# Patient Record
Sex: FEMALE | Race: WHITE | Employment: PART TIME | URBAN - METROPOLITAN AREA
[De-identification: names, ages, dates, MRNs, and addresses within clinical notes are randomized per-mention and may not be internally consistent; named-entity substitution may affect disease eponyms.]

---

## 2017-03-28 PROBLEM — Z82.49 FAMILY HISTORY OF HEART DISEASE: Status: ACTIVE | Noted: 2017-03-28

## 2017-03-28 PROBLEM — Z83.3 FAMILY HISTORY OF DIABETES MELLITUS: Status: ACTIVE | Noted: 2017-03-28

## 2017-03-28 PROBLEM — R23.2 HOT FLASHES: Status: ACTIVE | Noted: 2017-03-28

## 2017-03-28 PROBLEM — Z79.899 ENCOUNTER FOR LONG-TERM (CURRENT) DRUG USE: Status: ACTIVE | Noted: 2017-03-28

## 2017-03-28 PROBLEM — R53.82 CHRONIC FATIGUE: Status: ACTIVE | Noted: 2017-03-28

## 2017-03-28 PROBLEM — E55.9 VITAMIN D DEFICIENCY: Status: ACTIVE | Noted: 2017-03-28

## 2017-03-28 PROBLEM — Z76.89 ENCOUNTER TO ESTABLISH CARE WITH NEW DOCTOR: Status: ACTIVE | Noted: 2017-03-28

## 2017-03-29 PROBLEM — E78.2 MIXED HYPERLIPIDEMIA: Status: ACTIVE | Noted: 2017-03-29

## 2017-03-29 PROBLEM — E78.2 MIXED HYPERLIPIDEMIA: Chronic | Status: ACTIVE | Noted: 2017-03-29

## 2017-03-29 PROBLEM — E55.9 VITAMIN D DEFICIENCY: Chronic | Status: ACTIVE | Noted: 2017-03-28

## 2017-04-03 ENCOUNTER — HOSPITAL ENCOUNTER (OUTPATIENT)
Dept: ULTRASOUND IMAGING | Age: 32
Discharge: HOME | End: 2017-04-03
Attending: FAMILY MEDICINE
Payer: COMMERCIAL

## 2017-04-03 DIAGNOSIS — R00.0 TACHYCARDIA: ICD-10-CM

## 2017-04-03 DIAGNOSIS — E04.9 ENLARGED THYROID: ICD-10-CM

## 2017-04-03 DIAGNOSIS — R13.14 PHARYNGOESOPHAGEAL DYSPHAGIA: ICD-10-CM

## 2017-04-03 PROCEDURE — 76536 US EXAM OF HEAD AND NECK: CPT

## 2017-04-03 NOTE — PROGRESS NOTES
Thyroid ultrasound: there is a 3 mm hypoechoic solid nodule in the left side of the thyroid gland. Thyroid cancer appears to be more common in nodules >2 cm. Will recheck an ultrasound in 6 to 12 months.

## 2017-09-28 PROBLEM — Z87.898 HISTORY OF SEIZURE: Status: ACTIVE | Noted: 2017-09-28

## 2017-09-28 PROBLEM — J30.1 SEASONAL ALLERGIC RHINITIS DUE TO POLLEN: Status: ACTIVE | Noted: 2017-09-28

## 2017-09-28 PROBLEM — J30.1 SEASONAL ALLERGIC RHINITIS DUE TO POLLEN: Chronic | Status: ACTIVE | Noted: 2017-09-28

## 2017-09-28 PROBLEM — Z87.898 HISTORY OF SEIZURE: Chronic | Status: ACTIVE | Noted: 2017-09-28

## 2019-06-04 PROBLEM — M25.50 MULTIPLE JOINT PAIN: Status: ACTIVE | Noted: 2019-06-04

## 2019-06-04 PROBLEM — Z83.3 FAMILY HISTORY OF DIABETES MELLITUS IN MATERNAL GRANDMOTHER: Status: ACTIVE | Noted: 2019-06-04

## 2019-06-04 PROBLEM — D22.9 CHANGE IN COLOR OF SKIN MOLE: Status: ACTIVE | Noted: 2019-06-04

## 2020-10-29 PROBLEM — F33.0 MILD EPISODE OF RECURRENT MAJOR DEPRESSIVE DISORDER (HCC): Status: ACTIVE | Noted: 2020-10-29

## 2020-10-29 PROBLEM — Z79.899 ENCOUNTER FOR LONG-TERM (CURRENT) USE OF MEDICATIONS: Status: ACTIVE | Noted: 2017-03-28

## 2020-10-29 PROBLEM — F41.9 ANXIETY: Status: ACTIVE | Noted: 2020-10-29

## 2022-01-10 PROBLEM — R63.4 WEIGHT LOSS: Status: ACTIVE | Noted: 2022-01-10

## 2022-01-10 PROBLEM — Z87.898 HISTORY OF SYNCOPE: Status: ACTIVE | Noted: 2022-01-10

## 2022-03-18 PROBLEM — Z83.3 FAMILY HISTORY OF DIABETES MELLITUS: Status: ACTIVE | Noted: 2017-03-28

## 2022-03-18 PROBLEM — D22.9 CHANGE IN COLOR OF SKIN MOLE: Status: ACTIVE | Noted: 2019-06-04

## 2022-03-18 PROBLEM — R23.2 HOT FLASHES: Status: ACTIVE | Noted: 2017-03-28

## 2022-03-18 PROBLEM — F41.9 ANXIETY: Status: ACTIVE | Noted: 2020-10-29

## 2022-03-18 PROBLEM — F33.0 MILD EPISODE OF RECURRENT MAJOR DEPRESSIVE DISORDER (HCC): Status: ACTIVE | Noted: 2020-10-29

## 2022-03-19 PROBLEM — E55.9 VITAMIN D DEFICIENCY: Status: ACTIVE | Noted: 2017-03-28

## 2022-03-19 PROBLEM — R53.82 CHRONIC FATIGUE: Status: ACTIVE | Noted: 2017-03-28

## 2022-03-19 PROBLEM — Z87.898 HISTORY OF SYNCOPE: Status: ACTIVE | Noted: 2022-01-10

## 2022-03-19 PROBLEM — Z79.899 ENCOUNTER FOR LONG-TERM (CURRENT) DRUG USE: Status: ACTIVE | Noted: 2017-03-28

## 2022-03-19 PROBLEM — Z87.898 HISTORY OF SEIZURE: Status: ACTIVE | Noted: 2017-09-28

## 2022-03-19 PROBLEM — Z83.3 FAMILY HISTORY OF DIABETES MELLITUS IN MATERNAL GRANDMOTHER: Status: ACTIVE | Noted: 2019-06-04

## 2022-03-19 PROBLEM — J30.1 SEASONAL ALLERGIC RHINITIS DUE TO POLLEN: Status: ACTIVE | Noted: 2017-09-28

## 2022-03-19 PROBLEM — Z79.899 ENCOUNTER FOR LONG-TERM (CURRENT) USE OF MEDICATIONS: Status: ACTIVE | Noted: 2017-03-28

## 2022-03-19 PROBLEM — E78.2 MIXED HYPERLIPIDEMIA: Status: ACTIVE | Noted: 2017-03-29

## 2022-03-19 PROBLEM — M25.50 MULTIPLE JOINT PAIN: Status: ACTIVE | Noted: 2019-06-04

## 2022-03-20 PROBLEM — R63.4 WEIGHT LOSS: Status: ACTIVE | Noted: 2022-01-10

## 2022-03-20 PROBLEM — Z82.49 FAMILY HISTORY OF HEART DISEASE: Status: ACTIVE | Noted: 2017-03-28
